# Patient Record
(demographics unavailable — no encounter records)

---

## 2017-09-26 NOTE — XR
EXAMINATION TYPE: XR sinus

 

DATE OF EXAM: 9/25/2017

 

CLINICAL HISTORY: Facial pain , headache.

 

TECHNIQUE: Valle, Nichols, and lateral image of the skull are obtained.

 

COMPARISON: None.

 

FINDINGS: Mucosal thickening involving the maxillary sinuses and ethmoid air cells. Frontal sinus reji
ewhat hypoplastic.

Osseous structures intact. No air-fluid levels.

 

 IMPRESSION: 

1. Correlate for chronic sinusitis.

## 2018-02-22 NOTE — ED
General Adult HPI





- General


Chief complaint: Syncope


Stated complaint: syncope


Time Seen by Provider: 02/22/18 21:31


Source: patient, RN notes reviewed, old records reviewed


Mode of arrival: EMS


Limitations: no limitations





- History of Present Illness


Initial comments: 





This is an 81-year-old female ER for evaluation regards to syncopal event.  

Patient has history of pacemaker for unknown reason, family states patient is 

grossly secondary syncopal events.  Patient has no syncope since original 

event.  Denies headache chest pain shortness of breath or abdominal pain.  

Patient states her diet nap been decreased today.  She does not have any 

complaints currently.





- Related Data


 Home Medications











 Medication  Instructions  Recorded  Confirmed


 


Calcium Complete 1 tab PO BID 05/05/14 02/22/18


 


Cyanocobalamin [Vitamin B-12] 500 mcg PO DAILY 05/05/14 02/22/18


 


Fish Oil/Dha/Epa [Fish Oil 1,200 1,000 mg PO DAILY 05/05/14 02/22/18





mg Fish Oil]   


 


Folic Acid 1 mg PO HS 05/05/14 02/22/18


 


L.acidoph/B.long/L.plant/B.lac 1 cap PO DAILY 05/05/14 02/22/18





[Probiotic Acidophilus Beads]   


 


Multivitamin [Multivitamins] 1 tab PO DAILY 05/05/14 02/22/18


 


Pyridoxine [Vitamin B-6] 50 mg PO DAILY 05/05/14 02/22/18


 


Ibuprofen [Motrin] 200 mg PO Q4H 05/06/14 02/22/18


 


Magnesium Gluconate [Magonate] 500 mg PO DAILY 09/29/15 02/22/18


 


Potassium 99 mg PO DAILY 09/29/15 02/22/18


 


Minocycline HCl 100 mg PO MOWEFR 02/22/18 02/22/18


 


Turmeric Root Extract [Turmeric] 500 mg PO DAILY 02/22/18 02/22/18


 


predniSONE 5 mg PO HS 02/22/18 02/23/18











 Allergies











Allergy/AdvReac Type Severity Reaction Status Date / Time


 


celecoxib [From Celebrex] AdvReac  Swelling Verified 02/22/18 20:49


 


methotrexate AdvReac  Unknown Verified 02/22/18 20:49


 


Penicillins AdvReac  Rash/Hives Verified 02/23/18 01:25














Review of Systems


ROS Statement: 


Those systems with pertinent positive or pertinent negative responses have been 

documented in the HPI.





ROS Other: All systems not noted in ROS Statement are negative.





Past Medical History


Past Medical History: Rheumatoid Arthritis (RA)


Additional Past Medical History / Comment(s): hx arrhythmia/bradycardia, 

arthritis, Pacemaker, UTI


History of Any Multi-Drug Resistant Organisms: None Reported


Past Surgical History: Cholecystectomy, Hysterectomy, Pacemaker, Tonsillectomy


Additional Past Surgical History / Comment(s): knee surgery slime, thyroid surgery

(nodule on parathyroid removed)


Past Anesthesia/Blood Transfusion Reactions: No Reported Reaction


Type of Cardiac Device: Unknown


Device Placement Date:: unknown


Past Psychological History: Anxiety


Smoking Status: Former smoker


Past Alcohol Use History: None Reported


Past Drug Use History: None Reported





- Past Family History


  ** Mother


Family Medical History: Dementia, Diabetes Mellitus





  ** Brother(s)


Family Medical History: Dementia, Diabetes Mellitus





General Exam


Limitations: no limitations


General appearance: alert, in no apparent distress


Head exam: Present: atraumatic, normocephalic, normal inspection


Eye exam: Present: normal appearance, PERRL, EOMI.  Absent: scleral icterus, 

conjunctival injection, periorbital swelling


ENT exam: Present: normal exam, mucous membranes moist


Neck exam: Present: normal inspection.  Absent: tenderness, meningismus, 

lymphadenopathy


Respiratory exam: Present: normal lung sounds bilaterally.  Absent: respiratory 

distress, wheezes, rales, rhonchi, stridor


Cardiovascular Exam: Present: regular rate, normal rhythm, normal heart sounds.

  Absent: systolic murmur, diastolic murmur, rubs, gallop, clicks


GI/Abdominal exam: Present: soft, normal bowel sounds.  Absent: distended, 

tenderness, guarding, rebound, rigid


Extremities exam: Present: normal inspection, full ROM, normal capillary 

refill.  Absent: tenderness, pedal edema, joint swelling, calf tenderness


Back exam: Present: normal inspection


Neurological exam: Present: alert, oriented X3, CN II-XII intact


Psychiatric exam: Present: normal affect, normal mood


Skin exam: Present: warm, dry, intact, normal color.  Absent: rash





Course


 Vital Signs











  02/22/18 02/22/18 02/22/18





  20:33 20:45 22:38


 


Temperature 98.2 F  97.8 F


 


Pulse Rate 89 71 88


 


Respiratory 18 18 18





Rate   


 


Blood Pressure 141/89 143/68 130/95


 


O2 Sat by Pulse 98 98 97





Oximetry   














  02/23/18





  00:56


 


Temperature 97.3 F L


 


Pulse Rate 79


 


Respiratory 18





Rate 


 


Blood Pressure 147/67


 


O2 Sat by Pulse 98





Oximetry 














- Reevaluation(s)


Reevaluation #1: 





Unable to interrogate pacemaker here at this time.  Patient pacemaker company 

is called and they will interrogate in the morning





Reevaluation #2: 





 main complaint is diffuse body pain, no episodes of syncope here in the ER








EKG Findings





- EKG Comments:


EKG Findings:: EKG shows A. fib rate of 73, QRS 90, QTc 409





Medical Decision Making





- Medical Decision Making





81 female the ER for evaluation of syncopal event, syncope or on 10 minutes.  

No acute cause found, patient will be admitted for pacemaker interrogation





- Lab Data


Result diagrams: 


 02/22/18 20:45





 02/22/18 20:45


 Lab Results











  02/22/18 02/22/18 02/22/18 Range/Units





  20:45 20:45 20:45 


 


WBC   10.1   (3.8-10.6)  k/uL


 


RBC   4.44   (3.80-5.40)  m/uL


 


Hgb   12.1   (11.4-16.0)  gm/dL


 


Hct   36.8   (34.0-46.0)  %


 


MCV   83.0   (80.0-100.0)  fL


 


MCH   27.4   (25.0-35.0)  pg


 


MCHC   33.0   (31.0-37.0)  g/dL


 


RDW   13.0   (11.5-15.5)  %


 


Plt Count   407   (150-450)  k/uL


 


Neutrophils %   76   %


 


Lymphocytes %   13   %


 


Monocytes %   7   %


 


Eosinophils %   2   %


 


Basophils %   1   %


 


Neutrophils #   7.7   (1.3-7.7)  k/uL


 


Lymphocytes #   1.3   (1.0-4.8)  k/uL


 


Monocytes #   0.7   (0-1.0)  k/uL


 


Eosinophils #   0.2   (0-0.7)  k/uL


 


Basophils #   0.1   (0-0.2)  k/uL


 


PT     (9.0-12.0)  sec


 


INR     (<1.2)  


 


APTT     (22.0-30.0)  sec


 


D-Dimer     (<0.60)  mg/L FEU


 


Sodium    136 L  (137-145)  mmol/L


 


Potassium    4.9  (3.5-5.1)  mmol/L


 


Chloride    96 L  ()  mmol/L


 


Carbon Dioxide    28  (22-30)  mmol/L


 


Anion Gap    12  mmol/L


 


BUN    24 H  (7-17)  mg/dL


 


Creatinine    0.80  (0.52-1.04)  mg/dL


 


Est GFR (MDRD) Af Amer    >60  (>60 ml/min/1.73 sqM)  


 


Est GFR (MDRD) Non-Af    >60  (>60 ml/min/1.73 sqM)  


 


Glucose    139 H  (74-99)  mg/dL


 


Calcium    9.1  (8.4-10.2)  mg/dL


 


Phosphorus    5.2 H  (2.5-4.5)  mg/dL


 


Magnesium    2.3  (1.6-2.3)  mg/dL


 


Total Bilirubin    0.5  (0.2-1.3)  mg/dL


 


AST    47 H  (14-36)  U/L


 


ALT    23  (9-52)  U/L


 


Alkaline Phosphatase    52  ()  U/L


 


Total Creatine Kinase  81    ()  U/L


 


CK-MB (CK-2)  1.3    (0.0-2.4)  ng/mL


 


CK-MB (CK-2) Rel Index  1.6    


 


Troponin I  <0.012    (0.000-0.034)  ng/mL


 


Total Protein    6.3  (6.3-8.2)  g/dL


 


Albumin    3.6  (3.5-5.0)  g/dL


 


Urine Color     


 


Urine Appearance     (Clear)  


 


Urine pH     (5.0-8.0)  


 


Ur Specific Gravity     (1.001-1.035)  


 


Urine Protein     (Negative)  


 


Urine Glucose (UA)     (Negative)  


 


Urine Ketones     (Negative)  


 


Urine Blood     (Negative)  


 


Urine Nitrite     (Negative)  


 


Urine Bilirubin     (Negative)  


 


Urine Urobilinogen     (<2.0)  mg/dL


 


Ur Leukocyte Esterase     (Negative)  


 


Urine RBC     (0-5)  /hpf


 


Urine WBC     (0-5)  /hpf


 


Urine Bacteria     (None)  /hpf


 


Hyaline Casts     (0-2)  /lpf


 


Urine Mucus     (None)  /hpf


 


Urine Yeast (Budding)     (None)  /hpf














  02/22/18 02/22/18 Range/Units





  20:45 22:48 


 


WBC    (3.8-10.6)  k/uL


 


RBC    (3.80-5.40)  m/uL


 


Hgb    (11.4-16.0)  gm/dL


 


Hct    (34.0-46.0)  %


 


MCV    (80.0-100.0)  fL


 


MCH    (25.0-35.0)  pg


 


MCHC    (31.0-37.0)  g/dL


 


RDW    (11.5-15.5)  %


 


Plt Count    (150-450)  k/uL


 


Neutrophils %    %


 


Lymphocytes %    %


 


Monocytes %    %


 


Eosinophils %    %


 


Basophils %    %


 


Neutrophils #    (1.3-7.7)  k/uL


 


Lymphocytes #    (1.0-4.8)  k/uL


 


Monocytes #    (0-1.0)  k/uL


 


Eosinophils #    (0-0.7)  k/uL


 


Basophils #    (0-0.2)  k/uL


 


PT  9.6   (9.0-12.0)  sec


 


INR  1.0   (<1.2)  


 


APTT  22.2   (22.0-30.0)  sec


 


D-Dimer  2.25 H   (<0.60)  mg/L FEU


 


Sodium    (137-145)  mmol/L


 


Potassium    (3.5-5.1)  mmol/L


 


Chloride    ()  mmol/L


 


Carbon Dioxide    (22-30)  mmol/L


 


Anion Gap    mmol/L


 


BUN    (7-17)  mg/dL


 


Creatinine    (0.52-1.04)  mg/dL


 


Est GFR (MDRD) Af Amer    (>60 ml/min/1.73 sqM)  


 


Est GFR (MDRD) Non-Af    (>60 ml/min/1.73 sqM)  


 


Glucose    (74-99)  mg/dL


 


Calcium    (8.4-10.2)  mg/dL


 


Phosphorus    (2.5-4.5)  mg/dL


 


Magnesium    (1.6-2.3)  mg/dL


 


Total Bilirubin    (0.2-1.3)  mg/dL


 


AST    (14-36)  U/L


 


ALT    (9-52)  U/L


 


Alkaline Phosphatase    ()  U/L


 


Total Creatine Kinase    ()  U/L


 


CK-MB (CK-2)    (0.0-2.4)  ng/mL


 


CK-MB (CK-2) Rel Index    


 


Troponin I    (0.000-0.034)  ng/mL


 


Total Protein    (6.3-8.2)  g/dL


 


Albumin    (3.5-5.0)  g/dL


 


Urine Color   Yellow  


 


Urine Appearance   Clear  (Clear)  


 


Urine pH   7.5  (5.0-8.0)  


 


Ur Specific Gravity   1.007  (1.001-1.035)  


 


Urine Protein   Negative  (Negative)  


 


Urine Glucose (UA)   Negative  (Negative)  


 


Urine Ketones   Negative  (Negative)  


 


Urine Blood   Negative  (Negative)  


 


Urine Nitrite   Negative  (Negative)  


 


Urine Bilirubin   Negative  (Negative)  


 


Urine Urobilinogen   <2.0  (<2.0)  mg/dL


 


Ur Leukocyte Esterase   Small H  (Negative)  


 


Urine RBC   <1  (0-5)  /hpf


 


Urine WBC   12 H  (0-5)  /hpf


 


Urine Bacteria   Rare H  (None)  /hpf


 


Hyaline Casts   1  (0-2)  /lpf


 


Urine Mucus   Rare H  (None)  /hpf


 


Urine Yeast (Budding)   Occasional H  (None)  /hpf














- Radiology Data


Radiology results: report reviewed (Chest x-ray and CTA chest are negative), 

image reviewed





Disposition


Clinical Impression: 


 Vasovagal syncope, Syncope, Encounter for interrogation of cardiac pacemaker





Disposition: ADMITTED AS IP TO [facility] [place]


Condition: Undetermined

## 2018-02-23 NOTE — P.HPIM
History of Present Illness


H&P Date: 02/23/18


Chief Complaint: Syncope





HISTORY AND PHYSICAL AND DISCHARGE SUMMARY: 


This is an 81-year-old  female patient of Dr. Segovia with past 

medical history of advanced rheumatoid arthritis on prednisone and minocycline, 

chronic anemia, history of small bowel obstruction.  Patient was with her son 

at home and sitting at the dinner table when she seemed to drift up in lean to 

the right.  There was no fall or trauma.  She did not hit her head.  They used 

her med alert and the directions were for her son to start compressions which 

she did lightly.  EMS arrived and 10 minutes and found her blood sugar to be 

181 and blood pressure 60/40.  She came into [facility] 

emergency center and mental status returned to normal.  She had no residual 

weakness.  Patient thought that she was knocked out for about 1 minute.  She 

denies having any nausea vomiting or diarrhea but didn't drink very much for 

the day before.  There was no incontinence of urine and no seizure activity 

noted.  She was given 1 L of IV fluids in the emergency center and admitted to 

the selective care unit.  Chest x-ray showed no acute process.  CTA of the 

chest shows no pulmonary embolism.  Mild aneurysm of the ascending aorta.  

Pulmonary fibrotic changes.  EKG was atrial fibrillation rate controlled. 





Patient has been seen by cardiology and pacemaker has been interrogated.  

Troponins have been negative on 3 draws.  Blood pressure is running on the low 

side and patient is not on antihypertensives.  Patient will be given 1 L of 

fluid and discharge home today.  Patient happens to mention that she has a 

wound on her right second toe.  Full culture will be obtained and patient would 

be discharged home on Bactrim and follow-up with Dr. Segovia next week.  

Patient will be discharged home today in stable condition.  Patient is 

requesting prescription for tramadol which will be provided.








Discharge Medication List


Calcium Complete 1 tab PO BID 05/05/14 [History]


Cyanocobalamin [Vitamin B-12] 500 mcg PO DAILY 05/05/14 [History]


Fish Oil/Dha/Epa [Fish Oil 1,200 mg Fish Oil] 1,000 mg PO DAILY 05/05/14 [

History]


Folic Acid 1 mg PO HS 05/05/14 [History]


L.acidoph/B.long/L.plant/B.lac [Probiotic Acidophilus Beads] 1 cap PO DAILY 05/ 05/14 [History]


Multivitamin [Multivitamins] 1 tab PO DAILY 05/05/14 [History]


Pyridoxine [Vitamin B-6] 50 mg PO DAILY 05/05/14 [History]


Ibuprofen [Motrin] 200 mg PO Q4H 05/06/14 [History]


Magnesium Gluconate [Magonate] 500 mg PO DAILY 09/29/15 [History]


Potassium 99 mg PO DAILY 09/29/15 [History]


Minocycline HCl 100 mg PO MOWEFR 02/22/18 [History]


Turmeric Root Extract [Turmeric] 500 mg PO DAILY 02/22/18 [History]


predniSONE 5 mg PO HS 02/22/18 [History]


Sulfamethox-Tmp 800-160Mg [Bactrim -160 mg] 1 tab PO Q12HR #14 tab 02/23/ 18 [Rx]


traMADol HCL [Ultram] 50 mg PO Q6HR PRN #28 tab 02/23/18 [Rx]








Review of Systems


All systems: negative


Constitutional: Denies anorexia, Denies chills, Denies fatigue, Denies fever, 

Denies poor appetite


Eyes: denies blurred vision, denies pain


Ears, nose, mouth and throat: Denies headache, Denies sore throat


Cardiovascular: Reports syncope, Denies chest pain, Denies decreased exercise 

tolerance, Denies dyspnea on exertion, Denies leg edema, Denies lightheadedness

, Denies shortness of breath


Respiratory: Denies cough, Denies cough with sputum, Denies dyspnea, Denies 

excessive sputum, Denies hemoptysis, Denies home oxygen, Denies wheezing


Gastrointestinal: Denies abdominal pain, Denies diarrhea, Denies nausea, Denies 

vomiting


Genitourinary: Denies dysuria, Denies hematuria


Musculoskeletal: Denies myalgias


Integumentary: Reports wounds, Denies pruritus, Denies rash


Neurological: Denies numbness, Denies weakness


Psychiatric: Denies anxiety, Denies depression


Endocrine: Denies fatigue, Denies weight change





Past Medical History


Past Medical History: Rheumatoid Arthritis (RA)


Additional Past Medical History / Comment(s): hx arrhythmia/bradycardia, 

arthritis, Pacemaker, UTI


History of Any Multi-Drug Resistant Organisms: None Reported


Past Surgical History: Cholecystectomy, Hysterectomy, Pacemaker, Tonsillectomy


Additional Past Surgical History / Comment(s): knee surgery slime, thyroid surgery

(nodule on parathyroid removed)


Past Anesthesia/Blood Transfusion Reactions: No Reported Reaction


Type of Cardiac Device: Unknown


Device Placement Date:: unknown


Past Psychological History: Anxiety


Smoking Status: Former smoker


Past Alcohol Use History: None Reported


Past Drug Use History: None Reported


Additional Drug Use History / Comment(s): H was smoking many years ago.  No 

alcohol use or abuse.  Patient is  and lives alone.





- Past Family History


  ** Mother


Family Medical History: Dementia, Diabetes Mellitus





  ** Brother(s)


Family Medical History: Dementia, Diabetes Mellitus





Medications and Allergies


 Home Medications











 Medication  Instructions  Recorded  Confirmed  Type


 


Calcium Complete 1 tab PO BID 05/05/14 02/22/18 History


 


Cyanocobalamin [Vitamin B-12] 500 mcg PO DAILY 05/05/14 02/22/18 History


 


Fish Oil/Dha/Epa [Fish Oil 1,200 1,000 mg PO DAILY 05/05/14 02/22/18 History





mg Fish Oil]    


 


Folic Acid 1 mg PO HS 05/05/14 02/22/18 History


 


L.acidoph/B.long/L.plant/B.lac 1 cap PO DAILY 05/05/14 02/22/18 History





[Probiotic Acidophilus Beads]    


 


Multivitamin [Multivitamins] 1 tab PO DAILY 05/05/14 02/22/18 History


 


Pyridoxine [Vitamin B-6] 50 mg PO DAILY 05/05/14 02/22/18 History


 


Ibuprofen [Motrin] 200 mg PO Q4H 05/06/14 02/22/18 History


 


Magnesium Gluconate [Magonate] 500 mg PO DAILY 09/29/15 02/22/18 History


 


Potassium 99 mg PO DAILY 09/29/15 02/22/18 History


 


Minocycline HCl 100 mg PO MOWEFR 02/22/18 02/22/18 History


 


Turmeric Root Extract [Turmeric] 500 mg PO DAILY 02/22/18 02/22/18 History


 


predniSONE 5 mg PO HS 02/22/18 02/23/18 History


 


Sulfamethox-Tmp 800-160Mg [Bactrim 1 tab PO Q12HR #14 tab 02/23/18  Rx





-160 mg]    


 


traMADol HCL [Ultram] 50 mg PO Q6HR PRN #28 tab 02/23/18  Rx











 Allergies











Allergy/AdvReac Type Severity Reaction Status Date / Time


 


celecoxib [From Celebrex] AdvReac  Swelling Verified 02/22/18 20:49


 


methotrexate AdvReac  Unknown Verified 02/22/18 20:49


 


Penicillins AdvReac  Rash/Hives Verified 02/23/18 01:25














Physical Exam


Vitals: 


 Vital Signs











  Temp Pulse Pulse Resp BP BP BP


 


 02/23/18 13:38       124/66  111/61


 


 02/23/18 11:08  97.6 F   71  16   92/49 


 


 02/23/18 08:50       


 


 02/23/18 08:00  97.4 F L   71  14   


 


 02/23/18 07:58    78  18   91/49  83/44


 


 02/23/18 04:00  97.3 F L   72  16   


 


 02/23/18 00:56  97.3 F L  79   18  147/67  


 


 02/22/18 22:38  97.8 F  88   18  130/95  


 


 02/22/18 20:45   71   18  143/68  


 


 02/22/18 20:33  98.2 F  89   18  141/89  














  BP BP Pulse Ox


 


 02/23/18 13:38  114/66  


 


 02/23/18 11:08    98


 


 02/23/18 08:50    97


 


 02/23/18 08:00  102/48   97


 


 02/23/18 07:58  102/48  


 


 02/23/18 04:00   113/58  97


 


 02/23/18 00:56    98


 


 02/22/18 22:38    97


 


 02/22/18 20:45    98


 


 02/22/18 20:33    98








 Intake and Output











 02/22/18 02/23/18 02/23/18





 22:59 06:59 14:59


 


Intake Total   540


 


Balance   540


 


Intake:   


 


  Oral   540


 


Other:   


 


  Voiding Method  Toilet Toilet





   Bedpan


 


  # Voids  1 


 


  Weight 63.503 kg 64.4 kg 














- Constitutional


General appearance: average body habitus, cooperative, no disheveled, no mild 

distress, no morbidly obese, no no acute distress





- EENT


Eyes: PERRLA





- Respiratory


Respiratory: bilateral: CTA





- Cardiovascular


Rhythm: irregularly irregular


Heart sounds: normal: S1, S2





- Gastrointestinal


General gastrointestinal: no absent bowel sounds, no decreased bowel sounds, no 

distended, no hepatomegaly, no hyperactive bowel sounds, normal bowel sounds, 

no organomegaly, no rigid, no scaphoid, soft, no splenomegaly, no tenderness, 

no umbilical hernia, no ventral hernia





- Integumentary


Integumentary: ulcer





- Neurologic


Neurologic: CNII-XII intact





- Psychiatric


Psychiatric: A&O x's 3, appropriate affect, intact judgment & insight





Results


CBC & Chem 7: 


 02/22/18 20:45





 02/22/18 20:45


Labs: 


 Abnormal Lab Results - Last 24 Hours (Table)











  02/22/18 02/22/18 02/22/18 Range/Units





  20:45 20:45 22:48 


 


D-Dimer   2.25 H   (<0.60)  mg/L FEU


 


Sodium  136 L    (137-145)  mmol/L


 


Chloride  96 L    ()  mmol/L


 


BUN  24 H    (7-17)  mg/dL


 


Glucose  139 H    (74-99)  mg/dL


 


Phosphorus  5.2 H    (2.5-4.5)  mg/dL


 


AST  47 H    (14-36)  U/L


 


Ur Leukocyte Esterase    Small H  (Negative)  


 


Urine WBC    12 H  (0-5)  /hpf


 


Urine Bacteria    Rare H  (None)  /hpf


 


Urine Mucus    Rare H  (None)  /hpf


 


Urine Yeast (Budding)    Occasional H  (None)  /hpf














Thrombosis Risk Factor Assmnt





- Choose All That Apply


Any of the Below Risk Factors Present?: No


Each Risk Factor Represents 3 Points: Age 75 years or older


Other congenital or acquired thrombophilia - If yes, enter type in comment: No


Thrombosis Risk Factor Assessment Total Risk Factor Score: 3


Thrombosis Risk Factor Assessment Level: Moderate Risk





Assessment and Plan


Plan: 





1.  Syncopal episode most likely from vasovagal.  Pacemaker has been 

interrogated.  Patient had full return to consciousness, no seizure activity 

was noted.  Patient is noted to have hypotension.  Patient will be provided 1 L 

IV fluids and then discharged home today.





2.  Advanced rheumatoid arthritis currently on no site clean and prednisone, 

stable.





3.  Chronic back pain.  Patient's been started on tramadol.





4.  History of chronic atrial fibrillation status post pacemaker, stable.





5.  Right foot wound second toe.  Patient placed on Bactrim and culture 

obtained.





Patient placed as an observation status.


Discharge plan: Return home


Impression and plan of care have been directed as dictated by the signing 

physician.  Angy Chan nurse practitioner acting as scribe for signing 

physician.

## 2018-02-23 NOTE — CT
EXAMINATION TYPE: CT angio chest

 

DATE OF EXAM: 2/23/2018 12:18 AM

 

COMPARISON: NONE

 

HISTORY: chest pain and elevated D dimer

 

CT DLP: 329.50 mGycm

Automated exposure control for dose reduction was used.

 

CONTRAST: 

CTA scan of the thorax is performed with IV Contrast, patient injected with 75 mL of Omnipaque 350, p
ulmonary embolism protocol.  There are 3-D post processed images..  

 

FINDINGS:

 

There is coarse subpleural interstitial density at the posterior lung bases. There is no pleural effu
cyrus. There is no pericardial effusion. Heart is slightly enlarged. Thoracic aorta is atheromatous. T
here is mild aneurysm of ascending aorta that measures 4.5 cm. Descending aorta measures 3 cm. There 
is no mediastinal adenopathy.

 

I see no filling defects in the pulmonary arteries. There is no evidence of a pulmonary mass. There a
re no hilar masses. There is spurring in the thoracic spine.

 

 

IMPRESSION: 

NO EVIDENCE OF PULMONARY EMBOLISM. MILD ANEURYSM OF THE ASCENDING AORTA. PULMONARY FIBROTIC CHANGES.